# Patient Record
Sex: MALE | Race: OTHER | NOT HISPANIC OR LATINO | URBAN - METROPOLITAN AREA
[De-identification: names, ages, dates, MRNs, and addresses within clinical notes are randomized per-mention and may not be internally consistent; named-entity substitution may affect disease eponyms.]

---
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
## 2022-01-12 ENCOUNTER — APPOINTMENT (OUTPATIENT)
Dept: ENDOCRINOLOGY | Facility: CLINIC | Age: 22
End: 2022-01-12
Payer: COMMERCIAL

## 2022-01-12 VITALS
DIASTOLIC BLOOD PRESSURE: 78 MMHG | OXYGEN SATURATION: 99 % | HEIGHT: 69 IN | WEIGHT: 145 LBS | TEMPERATURE: 98.1 F | SYSTOLIC BLOOD PRESSURE: 118 MMHG | HEART RATE: 80 BPM | BODY MASS INDEX: 21.48 KG/M2

## 2022-01-12 DIAGNOSIS — Z83.3 FAMILY HISTORY OF DIABETES MELLITUS: ICD-10-CM

## 2022-01-12 DIAGNOSIS — N62 HYPERTROPHY OF BREAST: ICD-10-CM

## 2022-01-12 DIAGNOSIS — Z82.49 FAMILY HISTORY OF ISCHEMIC HEART DISEASE AND OTHER DISEASES OF THE CIRCULATORY SYSTEM: ICD-10-CM

## 2022-01-12 PROCEDURE — 99204 OFFICE O/P NEW MOD 45 MIN: CPT

## 2022-01-12 NOTE — REASON FOR VISIT
[Initial Evaluation] : an initial evaluation [Other___] : [unfilled] [FreeTextEntry2] : Dr. Rika ChavezMichaela

## 2022-01-12 NOTE — PHYSICAL EXAM
[Alert] : alert [No Acute Distress] : no acute distress [Normal Sclera/Conjunctiva] : normal sclera/conjunctiva [EOMI] : extra ocular movement intact [PERRL] : pupils equal, round and reactive to light [No Neck Mass] : no neck mass was observed [Thyroid Not Enlarged] : the thyroid was not enlarged [No Respiratory Distress] : no respiratory distress [Clear to Auscultation] : lungs were clear to auscultation bilaterally [Normal S1, S2] : normal S1 and S2 [Normal Rate] : heart rate was normal [Regular Rhythm] : with a regular rhythm [No Edema] : no peripheral edema [No Nipple Discharge] : no nipple discharge [Gynecomastia] : gynecomastia present [Normal Bowel Sounds] : normal bowel sounds [Not Tender] : non-tender [Soft] : abdomen soft [Normal Anterior Cervical Nodes] : no anterior cervical lymphadenopathy [Normal Posterior Cervical Nodes] : no posterior cervical lymphadenopathy [Normal Gait] : normal gait [Normal Strength/Tone] : muscle strength and tone were normal [No Motor Deficits] : the motor exam was normal [No Tremors] : no tremors [Normal Affect] : the affect was normal [Normal Insight/Judgement] : insight and judgment were intact [Normal Mood] : the mood was normal [de-identified] : Left gynecomastia

## 2022-01-12 NOTE — HISTORY OF PRESENT ILLNESS
[FreeTextEntry1] : 22 yo M is referred by pediatrician for evaluation of gynecomastia and elevated estrogen level. \par \par Patient reports h/o gynecomastia since age 11. States he was heavier when he was younger and started losing weight around 12-12 yo. Reports he has noticed gynecomastia again over the past two years. His nipples started to look and feel puffier since 4/2021. Reports intermittent tenderness at the left nipple which feels like an ache. States symptoms improved but now increasing again. Denies galactorrhea or changes in testicular size.\par \par Lab work done with his PCP 6/30/21: total serum estrogen 258.1; total testosterone 478; total HCG <3; FSH 2.9; LH 3.9; , HDL 65; triglycerides 75. \par \par B/L breast sono 7/7/21 for evaluation of a left retroareolar palpable mass- no ultrasonographic evidence of malignancy.\par States he is unable to palpate the mass now.\par \par PMH: anxiety, depression, insomnia (has difficulty falling asleep)\par Meds: none\par Supplements: ashwaganda (for sleep and anxiety), tongkat ali (for anxiety and testosterone levels)\par Sx Hx: none\par FHx: both grandmothers (DM), paternal gma and uncle (heart surgery)\par Social history: social alcohol use. Denies tobacco use.

## 2022-01-12 NOTE — REVIEW OF SYSTEMS
[Insomnia] : insomnia [Cold Intolerance] : cold intolerance [As Noted in HPI] : as noted in HPI [Fatigue] : no fatigue [Recent Weight Gain (___ Lbs)] : no recent weight gain [Recent Weight Loss (___ Lbs)] : no recent weight loss [Blurred Vision] : no blurred vision [Dysphagia] : no dysphagia [Neck Pain] : no neck pain [Chest Pain] : no chest pain [Slow Heart Rate] : heart rate is not slow [Palpitations] : no palpitations [Shortness Of Breath] : no shortness of breath [Nausea] : no nausea [Constipation] : no constipation [Abdominal Pain] : no abdominal pain [Vomiting] : no vomiting [Diarrhea] : no diarrhea [Polyuria] : no polyuria [Joint Pain] : no joint pain [Myalgia] : no myalgia  [Hirsutism] : no hirsutism [Hair Loss] : no hair loss [Headaches] : no headaches [Dizziness] : no dizziness [Tremors] : no tremors [Depression] : no depression [Anxiety] : no anxiety [Polydipsia] : no polydipsia [Galactorrhea] : no galactorrhea  [Easy Bleeding] : no ~M tendency for easy bleeding [Easy Bruising] : no tendency for easy bruising [Swelling] : no swelling

## 2022-01-12 NOTE — ASSESSMENT
[FreeTextEntry1] : 22 yo M presents for initial evaluation of gynecomastia and elevated estrogen level. H/o gynecomastia since age 11.\par \par -Will check AFP and Hcg to rule out gonadal tumors \par -Will rule out hyperprolatinemia \par - Reviewed breast sono (unremarkable) and lab results (serum estrogen 258.1) from June/July 2021.\par - Discussed causes of gynecomastia with the patient.\par - Check 8AM fasting labs to assess for non-pubertal causes of gynecomastia.\par - Advised patient to hold supplements for 2 weeks prior to lab work.\par \par F/u in 4 months pending lab results and symptoms. \par \par Seen and d/w Dr. Manriquez.\par Marlin Lucas NP (Brenda)  Endocrine Fellow

## 2022-05-11 ENCOUNTER — APPOINTMENT (OUTPATIENT)
Dept: ENDOCRINOLOGY | Facility: CLINIC | Age: 22
End: 2022-05-11

## 2022-11-23 NOTE — ED PROVIDER NOTE - CROS ED NEURO ALL NEG
Patient comes with complaints of f/chills/body aches/vomiting chest congestion cough and cramping in her left leg. Patient reports being around her grandchildren this week who were flu positive on Sunday. - - -
